# Patient Record
Sex: MALE | Race: BLACK OR AFRICAN AMERICAN
[De-identification: names, ages, dates, MRNs, and addresses within clinical notes are randomized per-mention and may not be internally consistent; named-entity substitution may affect disease eponyms.]

---

## 2020-12-22 ENCOUNTER — HOSPITAL ENCOUNTER (EMERGENCY)
Dept: HOSPITAL 63 - ER | Age: 30
Discharge: HOME | End: 2020-12-22
Payer: COMMERCIAL

## 2020-12-22 VITALS — BODY MASS INDEX: 34.88 KG/M2 | WEIGHT: 230.16 LBS | HEIGHT: 68 IN

## 2020-12-22 VITALS — SYSTOLIC BLOOD PRESSURE: 145 MMHG | DIASTOLIC BLOOD PRESSURE: 86 MMHG

## 2020-12-22 DIAGNOSIS — K08.89: Primary | ICD-10-CM

## 2020-12-22 PROCEDURE — 99283 EMERGENCY DEPT VISIT LOW MDM: CPT

## 2020-12-22 NOTE — PHYS DOC
Past History


Past Medical History:  No Pertinent History





Adult General


Chief Complaint


Chief Complaint:  DENTAL PROBLEM





HPI


HPI





Patient is a 30-year-old male complaining of dental pain.  This is an acute on 

chronic issue.  Patient has poor dentition at baseline, states he has had 48 

hours of worsening pain to his lower left molars in his mouth with gingival 

irritation and swelling around tooth in question.  Denies any fever, no other 

systemic symptoms or fluctuant mass development or drainage but admits focal 

areas of pain around several teeth that have been infected in the past.  He is 

in the process of scheduling outpatient dental visit but is here in the meantime

concern for potential infection needing antibiotics.  Reports the pain has been 

constant for past 12 hours but is responsive to Tylenol and Motrin 

administration





Review of Systems


Review of Systems


Fourteen body systems of review of systems have been reviewed. See HPI for 

pertinent positives and negative responses, other wise all other systems are 

negative, non-pertinent or non-contributory





Allergies


Allergies





Allergies








Coded Allergies Type Severity Reaction Last Updated Verified


 


  No Known Drug Allergies    12/22/20 No











Physical Exam


Physical Exam


Constitutional: Well developed, well nourished, no acute distress, non-toxic 

appearance. 


HENT: Normocephalic, atraumatic, bilateral external ears normal, oropharynx 

moist, no oral exudates, nose normal.  Grossly poor dentition, patient has x3 

infected dental caries to left lower molars teeth #17, 18, and 19.  He has what 

appears to be a dry socket with absent tooth at #17.  No obvious signs of 

infection, no crepitus, no streaking or other concerning/emergent findings


Eyes: PERRLA, EOMI, conjunctiva normal, no discharge.  


Neck: Normal range of motion, no tenderness, supple, no stridor.  


Cardiovascular: Heart rate regular per monitor


Lungs & Thorax: No respiratory distress or accessory muscle use, bilateral chest

rise


Abdomen: Abdomen soft, non-tender, bowel sounds present in all quadrants, no 

guarding or rebound, nonacute abdomen.


Skin: Warm, dry, no erythema, no rash.  


Back: No tenderness, no CVA tenderness.  


Extremities: No tenderness, no cyanosis, no clubbing, ROM intact, no edema.  


Neurologic: Alert and oriented X 3, grossly normal motor & sensory function, no 

focal deficits noted. 


Psychologic: Affect normal, judgement normal, mood normal.





Current Patient Data


Vital Signs





Vital Signs








  Date Time  Temp Pulse Resp B/P (MAP) Pulse Ox O2 Delivery O2 Flow Rate FiO2


 


12/22/20 11:15 97.9 71 20 145/86 (105) 99 Room Air  











EKG


EKG


[]





Radiology/Procedures


Radiology/Procedures


[]





Heart Score


HEART Score for Chest Pain:  








HEART Score for Chest Pain Response (Comments) Value


 


History Slighlty/Non-Suspicious 0


 


Age < 45 0


 


Risk Factors No Risk Factors 0


 


Total  0








Risk Factors:


Risk Factors:  DM, Current or recent (<one month) smoker, HTN, HLP, family 

history of CAD, obesity.


Risk Scores:


Risk Factors:  DM, Current or recent (<one month) smoker, HTN, HLP, family 

history of CAD, obesity.





Course & Med Decision Making


Course & Med Decision Making


ABCs nonconcerning


Comprehensive history and physical exam significant for most likely diagnosis of

 infected dental caries without other emergent and/or surgical issues


I discussed little role in further ER diagnostic work-up or intervention.  

Patient understands and is currently trying to establish an outpatient setting 

with a dentist


In the meantime, joint decision to start amoxicillin, patient was given x1 dose 

while in ER and tolerated this well


I stressed need for close outpatient follow-up to review today's ER visit. 

Strict return precautions were also discussed at length with good understanding 

by patient. Patient voiced understanding and agreement with the plan. Patient 

knows to come back for repeat evaluation if concerning signs or symptoms present

 prior to outpatient follow-up. Hemodynamically stable, ambulatory and well-

appearing at time of disposition.





Dragon Disclaimer


Dragon Disclaimer


This electronic medical record was generated, in whole or in part, using a voice

 recognition dictation system.





Departure


Departure:


Impression:  


   Primary Impression:  


   Infected dental caries


Disposition:  01 DC HOME SELF CARE/HOMELESS


Condition:  STABLE


Referrals:  


MANOLO GEIGER MD (PCP)


Patient Instructions:  Dental Caries





Additional Instructions:  


You were seen for dental pain.  There does appear to be early signs of infection

 at this time and so, you have been given a prescription for amoxicillin.  

Please take this antibiotic as scheduled to completion.  Take Ibuprofen (600-

800mg) and Tylenol (500-650mg) alternating every 4-6 hours to help with 

inflammation and pain while you contact a dentist for further care.  You should 

return to the ED if you develop worsening pain, fever > 101, swelling, redness, 

or any other new or concerning symptoms.  Unfortunately, your pain is not likely

 to improve without seeing a dentist for further evaluation and treatment of 

your poor dentition and dental caries.


Scripts


Amoxicillin (AMOXICILLIN) 500 Mg Capsule


1 CAP PO TID for DENTAL INFECTION for 4 Days, #12 CAP


   Prov: GAEL BONILLA DO         12/22/20











GAEL BONILLA DO                 Dec 22, 2020 11:25